# Patient Record
Sex: FEMALE | Race: WHITE | HISPANIC OR LATINO | Employment: FULL TIME | URBAN - METROPOLITAN AREA
[De-identification: names, ages, dates, MRNs, and addresses within clinical notes are randomized per-mention and may not be internally consistent; named-entity substitution may affect disease eponyms.]

---

## 2017-09-21 ENCOUNTER — APPOINTMENT (OUTPATIENT)
Dept: RADIOLOGY | Facility: CLINIC | Age: 52
End: 2017-09-21

## 2017-09-21 ENCOUNTER — ALLSCRIPTS OFFICE VISIT (OUTPATIENT)
Dept: OTHER | Facility: OTHER | Age: 52
End: 2017-09-21

## 2017-09-21 DIAGNOSIS — M25.561 PAIN IN RIGHT KNEE: ICD-10-CM

## 2017-09-21 DIAGNOSIS — M17.12 PRIMARY OSTEOARTHRITIS OF LEFT KNEE: ICD-10-CM

## 2017-09-21 DIAGNOSIS — M25.562 PAIN IN LEFT KNEE: ICD-10-CM

## 2017-09-21 PROCEDURE — 73564 X-RAY EXAM KNEE 4 OR MORE: CPT

## 2017-09-21 PROCEDURE — 73560 X-RAY EXAM OF KNEE 1 OR 2: CPT

## 2017-10-19 ENCOUNTER — HOSPITAL ENCOUNTER (EMERGENCY)
Facility: HOSPITAL | Age: 52
Discharge: HOME | End: 2017-10-19
Attending: EMERGENCY MEDICINE | Admitting: EMERGENCY MEDICINE
Payer: COMMERCIAL

## 2017-10-19 VITALS
HEART RATE: 61 BPM | TEMPERATURE: 97.8 F | DIASTOLIC BLOOD PRESSURE: 65 MMHG | WEIGHT: 160 LBS | SYSTOLIC BLOOD PRESSURE: 114 MMHG | OXYGEN SATURATION: 96 % | RESPIRATION RATE: 18 BRPM

## 2017-10-19 DIAGNOSIS — R51.9 HEADACHE: Primary | ICD-10-CM

## 2017-10-19 PROCEDURE — 96375 TX/PRO/DX INJ NEW DRUG ADDON: CPT

## 2017-10-19 PROCEDURE — 99283 EMERGENCY DEPT VISIT LOW MDM: CPT

## 2017-10-19 PROCEDURE — 96361 HYDRATE IV INFUSION ADD-ON: CPT

## 2017-10-19 PROCEDURE — 96374 THER/PROPH/DIAG INJ IV PUSH: CPT

## 2017-10-19 RX ORDER — DIPHENHYDRAMINE HYDROCHLORIDE 50 MG/ML
12.5 INJECTION INTRAMUSCULAR; INTRAVENOUS ONCE
Status: COMPLETED | OUTPATIENT
Start: 2017-10-19 | End: 2017-10-19

## 2017-10-19 RX ORDER — METOCLOPRAMIDE HYDROCHLORIDE 5 MG/ML
10 INJECTION INTRAMUSCULAR; INTRAVENOUS ONCE
Status: COMPLETED | OUTPATIENT
Start: 2017-10-19 | End: 2017-10-19

## 2017-10-19 RX ORDER — KETOROLAC TROMETHAMINE 30 MG/ML
15 INJECTION, SOLUTION INTRAMUSCULAR; INTRAVENOUS ONCE
Status: COMPLETED | OUTPATIENT
Start: 2017-10-19 | End: 2017-10-19

## 2017-10-19 RX ORDER — PHENAZOPYRIDINE HYDROCHLORIDE 200 MG/1
200 TABLET, FILM COATED ORAL
COMMUNITY
End: 2018-12-19

## 2017-10-19 RX ORDER — NITROFURANTOIN MACROCRYSTALS 100 MG/1
100 CAPSULE ORAL 4 TIMES DAILY
COMMUNITY
End: 2018-12-19

## 2017-10-19 RX ADMIN — SODIUM CHLORIDE 1000 ML: 0.9 INJECTION, SOLUTION INTRAVENOUS at 21:27

## 2017-10-19 RX ADMIN — METOCLOPRAMIDE 10 MG: 5 INJECTION, SOLUTION INTRAMUSCULAR; INTRAVENOUS at 21:27

## 2017-10-19 RX ADMIN — DIPHENHYDRAMINE HYDROCHLORIDE 12.5 MG: 50 INJECTION, SOLUTION INTRAMUSCULAR; INTRAVENOUS at 21:27

## 2017-10-19 RX ADMIN — KETOROLAC TROMETHAMINE 15 MG: 30 INJECTION, SOLUTION INTRAMUSCULAR at 21:27

## 2017-10-20 NOTE — ED NOTES
Pt  Reports taking Advil prior to arrival with no improvement        Nallely Ocampo, [place]  10/19/17 2033

## 2017-10-20 NOTE — DISCHARGE INSTRUCTIONS
Acute Headache, Ambulatory Care   GENERAL INFORMATION:   An acute headache  is pain or discomfort that starts suddenly and gets worse quickly  The cause of an acute headache may not be known  It may be triggered by stress, fatigue, hormones, food, or trauma  Common related symptoms include the following:   · Fever    · Sinus pressure    · Loss of memory    · Nausea or vomiting    · Problems with your vision, such as watery or red eyes, loss of vision, or pain in bright light    · Stiff neck    · Tenderness of the head and neck area    · Trouble staying awake, or being less alert than usual     · Weakness or less energy  Seek immediate care for the following symptoms:   · Severe pain    · A headache that occurs after a blow to the head, a fall, or other trauma     · Confusion or forgetfulness    · Numbness on one side of your face or body  Treatment for an acute headache  may include medicine to decrease pain  You may also need biofeedback or cognitive behavioral therapy  Ask your healthcare provider about these and other treatments for an acute headache  Manage my symptoms:   · Apply heat  on your head for 20 to 30 minutes every 2 hours for as many days as directed  Heat helps decrease pain and muscle spasms  You may alternate heat and ice  · Apply ice  on your head for 15 to 20 minutes every hour or as directed  Use an ice pack, or put crushed ice in a plastic bag  Cover it with a towel  Ice helps decrease pain  · Relax your muscles  Lie down in a comfortable position and close your eyes  Relax your muscles slowly  Start at your toes and work your way up your body  · Keep a record of your headaches  Write down when your headaches start and stop  Include your symptoms and what you were doing when the headache began  Record what you ate or drank for 24 hours before the headache started  Describe the pain and where it hurts  Keep track of what you did to treat your headache and whether it worked    Follow up with your healthcare provider as directed:  Bring your headache record with you when you see your healthcare provider  Write down your questions so you remember to ask them during your visits  CARE AGREEMENT:   You have the right to help plan your care  Learn about your health condition and how it may be treated  Discuss treatment options with your caregivers to decide what care you want to receive  You always have the right to refuse treatment  The above information is an  only  It is not intended as medical advice for individual conditions or treatments  Talk to your doctor, nurse or pharmacist before following any medical regimen to see if it is safe and effective for you  © 2014 [address] is for End User's use only and may not be sold, redistributed or otherwise used for commercial purposes  All illustrations and images included in CareNotes® are the copyrighted property of A D A M , Inc  or Elvis Beltran

## 2017-11-01 NOTE — ED PROVIDER NOTES
History  Chief Complaint   Patient presents with    Headache     Pt reports headache since Saturday, reports has hx of migranes has been taking ibuprofen and acetaminophen at home without any relief  Pt reports feeling pain to left eye from headache      70-year-old female presents complaining of a gradual onset generalized headache that began a few days ago  Patient has a history of migraines and states that this is similar to previous episodes of migraine  Denies visual changes, localized numbness, localized weakness  She has been tolerating solids and liquids without difficulty  She has had some nausea without vomiting  Complains of photophobia  No fevers  No history of trauma  History provided by:  Patient   used: No    Headache   Pain location:  Generalized  Quality:  Dull  Radiates to:  Does not radiate  Severity currently:  8/10  Severity at highest:  8/10  Onset quality:  Gradual  Timing:  Constant  Progression:  Unchanged  Similar to prior headaches: yes    Relieved by:  Nothing  Worsened by:  Light  Ineffective treatments:  Acetaminophen  Associated symptoms: nausea and photophobia    Associated symptoms: no abdominal pain, no back pain, no congestion, no cough, no diarrhea, no dizziness, no eye pain, no fatigue, no fever, no hearing loss, no neck pain, no neck stiffness, no numbness, no sore throat and no vomiting        Prior to Admission Medications   Prescriptions Last Dose Informant Patient Reported? Taking?   nitrofurantoin (MACRODANTIN) 100 mg capsule Past Week at Unknown time  Yes Yes   Sig: Take 100 mg by mouth 4 (four) times a day   phenazopyridine (PYRIDIUM) 200 mg tablet Past Week at Unknown time  Yes Yes   Sig: Take 200 mg by mouth 3 (three) times a day with meals      Facility-Administered Medications: None       History reviewed  No pertinent past medical history  History reviewed  No pertinent surgical history  History reviewed   No pertinent family history  I have reviewed and agree with the history as documented  Social History   Substance Use Topics    Smoking status: Never Smoker    Smokeless tobacco: Never Used    Alcohol use No        Review of Systems   Constitutional: Negative for activity change, appetite change, fatigue, fever and unexpected weight change  HENT: Negative for congestion, hearing loss, rhinorrhea, sore throat and voice change  Eyes: Positive for photophobia  Negative for pain and visual disturbance  Respiratory: Negative for apnea, cough, chest tightness and shortness of breath  Cardiovascular: Negative for chest pain  Gastrointestinal: Positive for nausea  Negative for abdominal pain, blood in stool, diarrhea and vomiting  Endocrine: Negative for polyphagia and polyuria  Genitourinary: Negative for difficulty urinating, dysuria, flank pain, frequency and urgency  Musculoskeletal: Negative for back pain, gait problem, neck pain and neck stiffness  Skin: Negative for color change and rash  Allergic/Immunologic: Negative for immunocompromised state  Neurological: Positive for headaches  Negative for dizziness, syncope, speech difficulty, light-headedness and numbness  Hematological: Does not bruise/bleed easily  Psychiatric/Behavioral: Negative for confusion and decreased concentration  Physical Exam  ED Triage Vitals [10/19/17 1956]   Temperature Pulse Respirations Blood Pressure SpO2   97 8 °F (36 6 °C) 87 16 147/70 97 %      Temp Source Heart Rate Source Patient Position - Orthostatic VS BP Location FiO2 (%)   Temporal Monitor Sitting Right arm --      Pain Score       8           Orthostatic Vital Signs  Vitals:    10/19/17 1956 10/19/17 2223 10/19/17 2320   BP: 147/70 106/51 114/65   Pulse: 87 70 61   Patient Position - Orthostatic VS: Sitting Lying Lying       Physical Exam   Constitutional: She is oriented to person, place, and time  She appears well-developed and well-nourished     HENT: Head: Normocephalic and atraumatic  Eyes: Conjunctivae and EOM are normal  Pupils are equal, round, and reactive to light  No scleral icterus  Normal visual fields   Neck: Normal range of motion  Neck supple  No JVD present  No tracheal deviation present  Cardiovascular: Normal rate and regular rhythm  Pulmonary/Chest: Effort normal and breath sounds normal  No respiratory distress  Abdominal: Soft  She exhibits no distension  There is no tenderness  Musculoskeletal: Normal range of motion  She exhibits no tenderness or deformity  Lymphadenopathy:     She has no cervical adenopathy  Neurological: She is alert and oriented to person, place, and time  GCS 15, No gross focal sensory or motor deficits  Cranial nerves 2-12 are grossly intact  Normal cerebellar testing  5/5 strength in all four extremities  Patient ambulates without difficulty or assistance  Skin: Skin is warm and dry  No rash noted  She is not diaphoretic  Psychiatric: She has a normal mood and affect  Vitals reviewed  ED Medications  Medications   sodium chloride 0 9 % bolus 1,000 mL (0 mL Intravenous Stopped 10/19/17 2237)   ketorolac (TORADOL) 30 mg/mL injection 15 mg (15 mg Intravenous Given 10/19/17 2127)   metoclopramide (REGLAN) injection 10 mg (10 mg Intravenous Given 10/19/17 2127)   diphenhydrAMINE (BENADRYL) injection 12 5 mg (12 5 mg Intravenous Given 10/19/17 2127)       Diagnostic Studies  Results Reviewed     None                 No orders to display              Procedures  Procedures       Phone Contacts  ED Phone Contact    ED Course  ED Course                                MDM  Number of Diagnoses or Management Options  Headache: new and requires workup  Diagnosis management comments: 51-year-old female presented complaining of headache, similar to previous episodes of migraine    Normal neurologic exam   No red flags on history or physical exam   She was treated symptomatically in the emergency department had good relief of her symptoms  She will be discharged to follow up with her primary care provider  We discussed return precautions for any new or worsening symptoms  Amount and/or Complexity of Data Reviewed  Review and summarize past medical records: yes      CritCare Time    Disposition  Final diagnoses:   Headache     Time reflects when diagnosis was documented in both MDM as applicable and the Disposition within this note     Time User Action Codes Description Comment    10/19/2017 11:13 PM Elsie Silva Add [R51] Headache       ED Disposition     ED Disposition Condition Comment    Discharge  Aakash Ward discharge to home/self care  Condition at discharge: Good        Follow-up Information     Follow up With Specialties Details Why [address] [facility] [address], [place] Family Medicine  Please follow-up with your primary care provider in the next 1 to 2 days to ensure resolution of your symptoms  If you have new or worsening symptoms please call your doctor or return to the emergency department  [address]          Discharge Medication List as of 10/19/2017 11:13 PM      CONTINUE these medications which have NOT CHANGED    Details   nitrofurantoin (MACRODANTIN) 100 mg capsule Take 100 mg by mouth 4 (four) times a day, Historical Med      phenazopyridine (PYRIDIUM) 200 mg tablet Take 200 mg by mouth 3 (three) times a day with meals, Historical Med           No discharge procedures on file      ED Provider  Electronically Signed by           Betty Parks MD  11/01/17 1600

## 2017-11-06 ENCOUNTER — GENERIC CONVERSION - ENCOUNTER (OUTPATIENT)
Dept: OTHER | Facility: OTHER | Age: 52
End: 2017-11-06

## 2017-11-06 DIAGNOSIS — M17.12 PRIMARY OSTEOARTHRITIS OF LEFT KNEE: ICD-10-CM

## 2017-11-16 ENCOUNTER — GENERIC CONVERSION - ENCOUNTER (OUTPATIENT)
Dept: OTHER | Facility: OTHER | Age: 52
End: 2017-11-16

## 2017-11-21 ENCOUNTER — GENERIC CONVERSION - ENCOUNTER (OUTPATIENT)
Dept: OTHER | Facility: OTHER | Age: 52
End: 2017-11-21

## 2017-12-18 ENCOUNTER — ALLSCRIPTS OFFICE VISIT (OUTPATIENT)
Dept: OTHER | Facility: OTHER | Age: 52
End: 2017-12-18

## 2017-12-19 NOTE — PROGRESS NOTES
Assessment  1  Left knee pain (719 46) (M25 562)   2  Primary localized osteoarthritis of left knee (715 16) (M17 12)    Plan  Primary localized osteoarthritis of left knee    · Follow-up PRN Evaluation and Treatment  Follow-up  Status: Complete  Done:[id number]    Discussion/Summary    Ms Mcguire is doing well  She will continue with physical therapy and transition home exercises when ready  She will follow up as needed  She will return to work without restrictions  She has not think she needs to work note at this time but will call if she does  Chief Complaint  presents for follow up L knee pain      History of Present Illness  HPI: 29-year-old female presents for follow-up left knee pain  At this time she denies pain  She has been went to physical therapy which has been helping  She is happy with her progress  Review of Systems   Constitutional: not feeling poorly  Musculoskeletal: as noted in HPI  Active Problems  1  Left knee pain (719 46) (M25 562)   2  Primary localized osteoarthritis of left knee (715 16) (M17 12)   3  Right knee pain (719 46) (M25 561)    Past Medical History   · History of hypertension (V12 59) (Z86 79)    The active problems and past medical history were reviewed and updated today  Surgical History   · History of Neck Surgery   · History of Surgery Seminal Vesicles    The surgical history was reviewed and updated today  Family History  Mother    · Denied: Family history of arthritis   · Denied: Family history of malignant neoplasm  Family History    · Denied: Family history of arthritis   · Denied: Family history of malignant neoplasm    The family history was reviewed and updated today  Social History   · Never a smoker  The social history was reviewed and updated today  Current Meds   1  Diclofenac Sodium 75 MG Oral Tablet Delayed Release; TAKE 1 TABLET BY MOUTH TWICE A DAY AS NEEDED FOR PAIN with food;  Therapy: [id number] to (Evaluate:20Nov2017); Last Rx:[id number] Ordered    The medication list was reviewed and updated today  Allergies  1  Penicillins   2  Sulfa Drugs    Vitals  Signs   Heart Rate: 80  Systolic: 860  Diastolic: 75  Height: 5 ft 3 in  Weight: 162 lb 5 oz  BMI Calculated: 28 75  BSA Calculated: 1 77    Physical Exam  hearing intact   Left Knee: Appearance: Normal  ROM: Full  Motor: Normal  Special Test: no laxity on Valgus Stress-- and-- no laxity on Varus Stress  Constitutional - General appearance: Normal   Musculoskeletal - Lower extremity compartments: Normal   Neurologic - Lower extremity peripheral neuro exam: Normal  Neuromuscular strength examination findings: involved side normal foot eversion, inversion, and great toe extension    Psychiatric - Orientation to person, place, and time: Normal -- Mood and affect: Normal       Signatures   Electronically signed by : Christin Gomez DO; Dec 18 2017 12:35PM EST                       (Author)

## 2018-01-13 VITALS
WEIGHT: 162.31 LBS | DIASTOLIC BLOOD PRESSURE: 72 MMHG | HEART RATE: 82 BPM | BODY MASS INDEX: 28.76 KG/M2 | SYSTOLIC BLOOD PRESSURE: 128 MMHG | HEIGHT: 63 IN

## 2018-01-22 VITALS
HEIGHT: 63 IN | BODY MASS INDEX: 28.76 KG/M2 | HEART RATE: 80 BPM | WEIGHT: 162.31 LBS | SYSTOLIC BLOOD PRESSURE: 125 MMHG | DIASTOLIC BLOOD PRESSURE: 75 MMHG

## 2018-01-22 VITALS
HEART RATE: 94 BPM | SYSTOLIC BLOOD PRESSURE: 133 MMHG | BODY MASS INDEX: 28.35 KG/M2 | DIASTOLIC BLOOD PRESSURE: 83 MMHG | WEIGHT: 160 LBS | HEIGHT: 63 IN

## 2018-12-19 ENCOUNTER — HOSPITAL ENCOUNTER (EMERGENCY)
Facility: HOSPITAL | Age: 53
Discharge: HOME | End: 2018-12-19
Attending: EMERGENCY MEDICINE
Payer: COMMERCIAL

## 2018-12-19 VITALS
TEMPERATURE: 98.4 F | SYSTOLIC BLOOD PRESSURE: 110 MMHG | OXYGEN SATURATION: 99 % | RESPIRATION RATE: 18 BRPM | DIASTOLIC BLOOD PRESSURE: 56 MMHG | HEART RATE: 59 BPM

## 2018-12-19 DIAGNOSIS — R11.0 NAUSEA: ICD-10-CM

## 2018-12-19 DIAGNOSIS — R10.12 LEFT UPPER QUADRANT PAIN: Primary | ICD-10-CM

## 2018-12-19 LAB
ALBUMIN SERPL BCP-MCNC: 4.2 G/DL (ref 3.5–5)
ALP SERPL-CCNC: 89 U/L (ref 46–116)
ALT SERPL W P-5'-P-CCNC: 27 U/L (ref 12–78)
ANION GAP SERPL CALCULATED.3IONS-SCNC: 7 MMOL/L (ref 4–13)
AST SERPL W P-5'-P-CCNC: 20 U/L (ref 5–45)
BACTERIA UR QL AUTO: ABNORMAL /HPF
BASOPHILS # BLD AUTO: 0.02 THOUSANDS/ΜL (ref 0–0.1)
BASOPHILS NFR BLD AUTO: 0 % (ref 0–1)
BILIRUB SERPL-MCNC: 0.56 MG/DL (ref 0.2–1)
BILIRUB UR QL STRIP: NEGATIVE
BUN SERPL-MCNC: 13 MG/DL (ref 5–25)
CALCIUM SERPL-MCNC: 9.7 MG/DL (ref 8.3–10.1)
CHLORIDE SERPL-SCNC: 102 MMOL/L (ref 100–108)
CLARITY UR: ABNORMAL
CO2 SERPL-SCNC: 30 MMOL/L (ref 21–32)
COLOR UR: YELLOW
CREAT SERPL-MCNC: 0.83 MG/DL (ref 0.6–1.3)
EOSINOPHIL # BLD AUTO: 0.26 THOUSAND/ΜL (ref 0–0.61)
EOSINOPHIL NFR BLD AUTO: 5 % (ref 0–6)
ERYTHROCYTE [DISTWIDTH] IN BLOOD BY AUTOMATED COUNT: 12.2 % (ref 11.6–15.1)
GFR SERPL CREATININE-BSD FRML MDRD: 81 ML/MIN/1.73SQ M
GLUCOSE SERPL-MCNC: 120 MG/DL (ref 65–140)
GLUCOSE UR STRIP-MCNC: NEGATIVE MG/DL
HCT VFR BLD AUTO: 43.9 % (ref 34.8–46.1)
HGB BLD-MCNC: 14 G/DL (ref 11.5–15.4)
HGB UR QL STRIP.AUTO: ABNORMAL
IMM GRANULOCYTES # BLD AUTO: 0.01 THOUSAND/UL (ref 0–0.2)
IMM GRANULOCYTES NFR BLD AUTO: 0 % (ref 0–2)
KETONES UR STRIP-MCNC: NEGATIVE MG/DL
LEUKOCYTE ESTERASE UR QL STRIP: NEGATIVE
LIPASE SERPL-CCNC: 143 U/L (ref 73–393)
LYMPHOCYTES # BLD AUTO: 2.65 THOUSANDS/ΜL (ref 0.6–4.47)
LYMPHOCYTES NFR BLD AUTO: 52 % (ref 14–44)
MCH RBC QN AUTO: 28.6 PG (ref 26.8–34.3)
MCHC RBC AUTO-ENTMCNC: 31.9 G/DL (ref 31.4–37.4)
MCV RBC AUTO: 90 FL (ref 82–98)
MONOCYTES # BLD AUTO: 0.45 THOUSAND/ΜL (ref 0.17–1.22)
MONOCYTES NFR BLD AUTO: 9 % (ref 4–12)
NEUTROPHILS # BLD AUTO: 1.77 THOUSANDS/ΜL (ref 1.85–7.62)
NEUTS SEG NFR BLD AUTO: 34 % (ref 43–75)
NITRITE UR QL STRIP: NEGATIVE
NON-SQ EPI CELLS URNS QL MICRO: ABNORMAL /HPF
NRBC BLD AUTO-RTO: 0 /100 WBCS
PH UR STRIP.AUTO: 6 [PH] (ref 4.5–8)
PLATELET # BLD AUTO: 343 THOUSANDS/UL (ref 149–390)
PMV BLD AUTO: 9.1 FL (ref 8.9–12.7)
POTASSIUM SERPL-SCNC: 4 MMOL/L (ref 3.5–5.3)
PROT SERPL-MCNC: 8 G/DL (ref 6.4–8.2)
PROT UR STRIP-MCNC: NEGATIVE MG/DL
RBC # BLD AUTO: 4.89 MILLION/UL (ref 3.81–5.12)
RBC #/AREA URNS AUTO: ABNORMAL /HPF
SODIUM SERPL-SCNC: 139 MMOL/L (ref 136–145)
SP GR UR STRIP.AUTO: 1.01 (ref 1–1.03)
UROBILINOGEN UR QL STRIP.AUTO: 0.2 E.U./DL
WBC # BLD AUTO: 5.16 THOUSAND/UL (ref 4.31–10.16)
WBC #/AREA URNS AUTO: ABNORMAL /HPF

## 2018-12-19 PROCEDURE — 85025 COMPLETE CBC W/AUTO DIFF WBC: CPT | Performed by: EMERGENCY MEDICINE

## 2018-12-19 PROCEDURE — 96375 TX/PRO/DX INJ NEW DRUG ADDON: CPT

## 2018-12-19 PROCEDURE — 36415 COLL VENOUS BLD VENIPUNCTURE: CPT | Performed by: EMERGENCY MEDICINE

## 2018-12-19 PROCEDURE — 81001 URINALYSIS AUTO W/SCOPE: CPT

## 2018-12-19 PROCEDURE — 80053 COMPREHEN METABOLIC PANEL: CPT | Performed by: EMERGENCY MEDICINE

## 2018-12-19 PROCEDURE — 99284 EMERGENCY DEPT VISIT MOD MDM: CPT

## 2018-12-19 PROCEDURE — 96374 THER/PROPH/DIAG INJ IV PUSH: CPT

## 2018-12-19 PROCEDURE — 83690 ASSAY OF LIPASE: CPT | Performed by: EMERGENCY MEDICINE

## 2018-12-19 PROCEDURE — 96361 HYDRATE IV INFUSION ADD-ON: CPT

## 2018-12-19 RX ORDER — KETOROLAC TROMETHAMINE 30 MG/ML
15 INJECTION, SOLUTION INTRAMUSCULAR; INTRAVENOUS ONCE
Status: COMPLETED | OUTPATIENT
Start: 2018-12-19 | End: 2018-12-19

## 2018-12-19 RX ORDER — DICYCLOMINE HCL 20 MG
20 TABLET ORAL 2 TIMES DAILY
Qty: 10 TABLET | Refills: 0 | Status: SHIPPED | OUTPATIENT
Start: 2018-12-19

## 2018-12-19 RX ORDER — METOCLOPRAMIDE 10 MG/1
10 TABLET ORAL EVERY 6 HOURS
Qty: 10 TABLET | Refills: 0 | Status: SHIPPED | OUTPATIENT
Start: 2018-12-19

## 2018-12-19 RX ORDER — METOCLOPRAMIDE HYDROCHLORIDE 5 MG/ML
10 INJECTION INTRAMUSCULAR; INTRAVENOUS ONCE
Status: COMPLETED | OUTPATIENT
Start: 2018-12-19 | End: 2018-12-19

## 2018-12-19 RX ADMIN — SODIUM CHLORIDE 1000 ML: 0.9 INJECTION, SOLUTION INTRAVENOUS at 12:21

## 2018-12-19 RX ADMIN — METOCLOPRAMIDE 10 MG: 5 INJECTION, SOLUTION INTRAMUSCULAR; INTRAVENOUS at 12:22

## 2018-12-19 RX ADMIN — KETOROLAC TROMETHAMINE 15 MG: 30 INJECTION, SOLUTION INTRAMUSCULAR at 12:21

## 2018-12-19 NOTE — DISCHARGE INSTRUCTIONS
Dolor abdominal   LO QUE NECESITA SABER:   El dolor abdominal puede ser sordo, [place], o velma  Usted puede sentir dolor localizado en neris beth área del abdomen o en todo el abdomen  El dolor puede ser causado por neris afección jaimie estreñimiento, sensibilidad o intoxicación alimentaria, infección o neris obstrucción  Asimismo, el dolor abdominal puede deberse a neris hernia, apendicitis o [place]  Las enfermedades del hígado, la vesícula o el riñón también pueden causar dolor abdominal  La causa del dolor abdominal puede ser desconocida  INSTRUCCIONES SOBRE EL ALEM HOSPITALARIA:   Regrese a la brad de emergencias si:   · Usted comienza a sentir un dolor en el pecho o dificultad para respirar que antes no sentía  · Usted siente un dolor con pulsaciones en la parte superior del abdomen o en la parte inferior de la espalda que de repente se vuelve claudia  · El dolor se localiza en la parte inferior derecha del abdomen y [place] cuando se [place]  · Usted tiene fiebre por encima de los 100 4 °F (38 °C) o escalofríos  · Usted tiene vómitos y no puede retener líquidos ni alimentos en el estómago  · El dolor no mejora o empeora en las próximas 8 a 12 horas  · Usted nota felix en de la paz vómito o heces, o éstas tienen un aspecto negruzco y alquitranado  · [place] piel o las partes zev de chinmay ojos se vuelven amarillentas  · Si usted es neris geovanni y presenta abundante sangrado vaginal que no es de la paz menstruación  Pregúntele a de la paz [place] vitaminas y minerales son adecuados para usted  · Usted siente dolor en la parte inferior de la espalda  · Usted es [place] y tiene dolor en los testículos  · Siente dolor al Liseth Javier  · Usted tiene preguntas o inquietudes acerca de de la paz condición o cuidado  Acuda en 24 horas a neris doe de seguimiento con de la paz médico o jaimie se le indique:  Anote chinmay preguntas para que se acuerde de hacerlas brett chinmay visitas     Medicamentos:   · Ryanne Valentine ser administrados para calmar de la paz estómago y prevenir los vómitos o para disminuir el dolor  Pregunte cómo se debe lo los analgésicos de forma alejandre  · [place] chinmay medicamentos jaimie se le haya indicado  Consulte con de la paz médico si usted tej que de la paz medicamento no le está ayudando o si presenta efectos secundarios  Infórmele si es alérgico a algún medicamento  Mantenga neris lista actualizada de los [place], las vitaminas y los productos herbales que mable  Incluya los siguientes datos de los medicamentos: cantidad, frecuencia y motivo de administración  Traiga con usted la lista o los envases de la píldoras a chinmay citas de seguimiento  Lleve la lista de los medicamentos con usted en piyush de neris emergencia  © 2017 [address] Information is for End User's use only and may not be sold, redistributed or otherwise used for commercial purposes  All illustrations and images included in CareNotes® are the copyrighted property of A D A M , Inc  or Elvis Beltran  Esta información es sólo para uso en educación  [place] intención no es darle un consejo médico sobre enfermedades o tratamientos  Colsulte con de la paz [place] farmacéutico antes de seguir cualquier régimen médico para saber si es seguro y efectivo para usted

## 2018-12-19 NOTE — ED PROVIDER NOTES
History  Chief Complaint   Patient presents with    Abdominal Pain     Left sided abdominal pain that started today  Has nausea  Denies v/d  History provided by:  Patient   used: No    Abdominal Pain   Pain location:  LUQ  Pain quality: throbbing    Pain radiates to:  Does not radiate  Duration:  4 hours  Timing:  Constant  Progression:  Unchanged  Chronicity:  New  Context: not previous surgeries    Relieved by:  Nothing  Worsened by:  Nothing  Ineffective treatments:  NSAIDs  Associated symptoms: nausea    Associated symptoms: no chills, no constipation, no cough, no diarrhea, no dysuria, no fever, no hematuria, no vaginal bleeding, no vaginal discharge and no vomiting        None       History reviewed  No pertinent past medical history  History reviewed  No pertinent surgical history  History reviewed  No pertinent family history  I have reviewed and agree with the history as documented  Social History   Substance Use Topics    Smoking status: Never Smoker    Smokeless tobacco: Never Used    Alcohol use No        Review of Systems   Constitutional: Negative for appetite change, chills and fever  HENT: Positive for rhinorrhea  Negative for congestion  Respiratory: Negative for cough  Gastrointestinal: Positive for abdominal pain and nausea  Negative for abdominal distention, blood in stool, constipation, diarrhea and vomiting  Genitourinary: Negative for dysuria, flank pain, frequency, hematuria, urgency, vaginal bleeding and vaginal discharge  Musculoskeletal: Negative for back pain  All other systems reviewed and are negative  Physical Exam  Physical Exam   Constitutional: She is oriented to person, place, and time  She appears well-developed and well-nourished  No distress  HENT:   Head: Normocephalic  Mouth/Throat: Oropharynx is clear and moist and mucous membranes are normal    Neck: Normal range of motion  Neck supple     Cardiovascular: Normal rate, regular rhythm, normal heart sounds and intact distal pulses  Exam reveals no gallop and no friction rub  No murmur heard  Pulmonary/Chest: Effort normal and breath sounds normal  No respiratory distress  She has no wheezes  She has no rales  Abdominal: Soft  Normal appearance and bowel sounds are normal  She exhibits no distension and no mass  There is tenderness in the left upper quadrant  There is no rigidity, no rebound, no guarding, no CVA tenderness, no tenderness at McBurney's point and negative Araujo's sign  Lymphadenopathy:     She has no cervical adenopathy  Neurological: She is alert and oriented to person, place, and time  Skin: Skin is warm, dry and intact  No rash noted  No pallor  Nursing note and vitals reviewed        Vital Signs  ED Triage Vitals [12/19/18 1202]   Temperature Pulse Respirations Blood Pressure SpO2   98 4 °F (36 9 °C) 65 16 117/56 98 %      Temp Source Heart Rate Source Patient Position - Orthostatic VS BP Location FiO2 (%)   Temporal Monitor Sitting Right arm --      Pain Score       8           Vitals:    12/19/18 1202 12/19/18 1329   BP: 117/56 110/56   Pulse: 65 59   Patient Position - Orthostatic VS: Sitting Lying       Visual Acuity      ED Medications  Medications   sodium chloride 0 9 % bolus 1,000 mL (0 mL Intravenous Stopped 12/19/18 1329)   ketorolac (TORADOL) injection 15 mg (15 mg Intravenous Given 12/19/18 1221)   metoclopramide (REGLAN) injection 10 mg (10 mg Intravenous Given 12/19/18 1222)       Diagnostic Studies  Results Reviewed     Procedure Component Value Units Date/Time    Urine Microscopic [[id number]]  (Abnormal) Collected:  12/19/18 1315    Lab Status:  Final result Specimen:  Urine from Urine, Clean Catch Updated:  12/19/18 1323     RBC, UA 2-4 (A) /hpf      WBC, UA None Seen /hpf      Epithelial Cells Occasional /hpf      Bacteria, UA None Seen /hpf     POCT urinalysis dipstick [[id number]]  (Abnormal) Resulted:  12/19/18 1301    Lab Status:  Final result Specimen:  Urine from Urine, Other Updated:  12/19/18 1301    ED Urine Macroscopic [[id number]]  (Abnormal) Collected:  12/19/18 1315    Lab Status:  Final result Specimen:  Urine Updated:  12/19/18 1259     Color, UA Yellow     Clarity, UA Slightly Cloudy     pH, UA 6 0     Leukocytes, UA Negative     Nitrite, UA Negative     Protein, UA Negative mg/dl      Glucose, UA Negative mg/dl      Ketones, UA Negative mg/dl      Urobilinogen, UA 0 2 E U /dl      Bilirubin, UA Negative     Blood, UA Trace (A)     Specific [place], UA 1 015    Narrative:       CLINITEK RESULT    Comprehensive metabolic panel [[id number]] Collected:  12/19/18 1220    Lab Status:  Final result Specimen:  Blood from Arm, Left Updated:  12/19/18 1248     Sodium 139 mmol/L      Potassium 4 0 mmol/L      Chloride 102 mmol/L      CO2 30 mmol/L      ANION GAP 7 mmol/L      BUN 13 mg/dL      Creatinine 0 83 mg/dL      Glucose 120 mg/dL      Calcium 9 7 mg/dL      AST 20 U/L      ALT 27 U/L      Alkaline Phosphatase 89 U/L      Total Protein 8 0 g/dL      Albumin 4 2 g/dL      Total Bilirubin 0 56 mg/dL      eGFR 81 ml/min/1 73sq m     Narrative:         National Kidney Disease Education Program recommendations are as follows:  GFR calculation is accurate only with a steady state creatinine  Chronic Kidney disease less than 60 ml/min/1 73 sq  meters  Kidney failure less than 15 ml/min/1 73 sq  meters      Lipase [[id number]]  (Normal) Collected:  12/19/18 1220    Lab Status:  Final result Specimen:  Blood from Arm, Left Updated:  12/19/18 1248     Lipase 143 u/L     CBC and differential [[id number]]  (Abnormal) Collected:  12/19/18 1220    Lab Status:  Final result Specimen:  Blood from Arm, Left Updated:  12/19/18 1230     WBC 5 16 Thousand/uL      RBC 4 89 Million/uL      Hemoglobin 14 0 g/dL      Hematocrit 43 9 %      MCV 90 fL      MCH 28 6 pg      MCHC 31 9 g/dL      RDW 12 2 %      MPV 9 1 fL      Platelets 117 Thousands/uL nRBC 0 /100 WBCs      Neutrophils Relative 34 (L) %      Immat GRANS % 0 %      Lymphocytes Relative 52 (H) %      Monocytes Relative 9 %      Eosinophils Relative 5 %      Basophils Relative 0 %      Neutrophils Absolute 1 77 (L) Thousands/µL      Immature Grans Absolute 0 01 Thousand/uL      Lymphocytes Absolute 2 65 Thousands/µL      Monocytes Absolute 0 45 Thousand/µL      Eosinophils Absolute 0 26 Thousand/µL      Basophils Absolute 0 02 Thousands/µL                  No orders to display              Procedures  Procedures       Phone Contacts  ED Phone Contact    ED Course  ED Course as of Dec 19 1359   Wed Dec 19, 2018   1324 Pt feels better  Updated pt and family on labs  MDM  Number of Diagnoses or Management Options     Amount and/or Complexity of Data Reviewed  Clinical lab tests: ordered and reviewed  Tests in the medicine section of CPT®: reviewed and ordered      CritCare Time    Disposition  Final diagnoses:   Left upper quadrant pain   Nausea     Time reflects when diagnosis was documented in both MDM as applicable and the Disposition within this note     Time User Action Codes Description Comment    12/19/2018  1:20 PM Gabbi Ham [R10 12] Left upper quadrant pain     12/19/2018  1:21 PM Niurka Cartagena [R11 0] Nausea       ED Disposition     ED Disposition Condition Comment    Discharge  [place] discharge to home/self care  Condition at discharge: Good        Follow-up Information    None         Discharge Medication List as of 12/19/2018  1:21 PM      START taking these medications    Details   dicyclomine (BENTYL) 20 mg tablet Take 1 tablet (20 mg total) by mouth 2 (two) times a day, Starting Wed 12/19/2018, Print      metoclopramide (REGLAN) 10 mg tablet Take 1 tablet (10 mg total) by mouth every 6 (six) hours, Starting Wed 12/19/2018, Print           No discharge procedures on file      ED Provider  Electronically Signed by Zeny Coto 24, DO  12/19/18 1351

## 2019-09-15 ENCOUNTER — HOSPITAL ENCOUNTER (EMERGENCY)
Facility: HOSPITAL | Age: 54
Discharge: HOME | End: 2019-09-15
Attending: EMERGENCY MEDICINE | Admitting: EMERGENCY MEDICINE
Payer: COMMERCIAL

## 2019-09-15 ENCOUNTER — APPOINTMENT (EMERGENCY)
Dept: CT IMAGING | Facility: HOSPITAL | Age: 54
End: 2019-09-15
Payer: COMMERCIAL

## 2019-09-15 VITALS
HEART RATE: 72 BPM | DIASTOLIC BLOOD PRESSURE: 50 MMHG | RESPIRATION RATE: 16 BRPM | BODY MASS INDEX: 24.45 KG/M2 | SYSTOLIC BLOOD PRESSURE: 100 MMHG | WEIGHT: 138.01 LBS | OXYGEN SATURATION: 94 % | TEMPERATURE: 97.6 F

## 2019-09-15 DIAGNOSIS — R10.31 RIGHT LOWER QUADRANT ABDOMINAL PAIN: Primary | ICD-10-CM

## 2019-09-15 LAB
ANION GAP SERPL CALCULATED.3IONS-SCNC: 7 MMOL/L (ref 4–13)
BACTERIA UR QL AUTO: ABNORMAL /HPF
BASOPHILS # BLD AUTO: 0.03 THOUSANDS/ΜL (ref 0–0.1)
BASOPHILS NFR BLD AUTO: 1 % (ref 0–1)
BILIRUB UR QL STRIP: NEGATIVE
BUN SERPL-MCNC: 12 MG/DL (ref 5–25)
CALCIUM SERPL-MCNC: 9.2 MG/DL (ref 8.3–10.1)
CHLORIDE SERPL-SCNC: 104 MMOL/L (ref 100–108)
CLARITY UR: CLEAR
CO2 SERPL-SCNC: 31 MMOL/L (ref 21–32)
COLOR UR: YELLOW
CREAT SERPL-MCNC: 0.67 MG/DL (ref 0.6–1.3)
EOSINOPHIL # BLD AUTO: 0.22 THOUSAND/ΜL (ref 0–0.61)
EOSINOPHIL NFR BLD AUTO: 4 % (ref 0–6)
ERYTHROCYTE [DISTWIDTH] IN BLOOD BY AUTOMATED COUNT: 11.9 % (ref 11.6–15.1)
GFR SERPL CREATININE-BSD FRML MDRD: 100 ML/MIN/1.73SQ M
GLUCOSE SERPL-MCNC: 111 MG/DL (ref 65–140)
GLUCOSE UR STRIP-MCNC: NEGATIVE MG/DL
HCT VFR BLD AUTO: 43.7 % (ref 34.8–46.1)
HGB BLD-MCNC: 14 G/DL (ref 11.5–15.4)
HGB UR QL STRIP.AUTO: ABNORMAL
IMM GRANULOCYTES # BLD AUTO: 0.01 THOUSAND/UL (ref 0–0.2)
IMM GRANULOCYTES NFR BLD AUTO: 0 % (ref 0–2)
KETONES UR STRIP-MCNC: NEGATIVE MG/DL
LEUKOCYTE ESTERASE UR QL STRIP: NEGATIVE
LYMPHOCYTES # BLD AUTO: 2.81 THOUSANDS/ΜL (ref 0.6–4.47)
LYMPHOCYTES NFR BLD AUTO: 48 % (ref 14–44)
MCH RBC QN AUTO: 28.7 PG (ref 26.8–34.3)
MCHC RBC AUTO-ENTMCNC: 32 G/DL (ref 31.4–37.4)
MCV RBC AUTO: 90 FL (ref 82–98)
MONOCYTES # BLD AUTO: 0.54 THOUSAND/ΜL (ref 0.17–1.22)
MONOCYTES NFR BLD AUTO: 10 % (ref 4–12)
NEUTROPHILS # BLD AUTO: 2.09 THOUSANDS/ΜL (ref 1.85–7.62)
NEUTS SEG NFR BLD AUTO: 37 % (ref 43–75)
NITRITE UR QL STRIP: NEGATIVE
NON-SQ EPI CELLS URNS QL MICRO: ABNORMAL /HPF
NRBC BLD AUTO-RTO: 0 /100 WBCS
PH UR STRIP.AUTO: 6 [PH] (ref 4.5–8)
PLATELET # BLD AUTO: 362 THOUSANDS/UL (ref 149–390)
PMV BLD AUTO: 9.1 FL (ref 8.9–12.7)
POTASSIUM SERPL-SCNC: 3.8 MMOL/L (ref 3.5–5.3)
PROT UR STRIP-MCNC: NEGATIVE MG/DL
RBC # BLD AUTO: 4.87 MILLION/UL (ref 3.81–5.12)
RBC #/AREA URNS AUTO: ABNORMAL /HPF
SODIUM SERPL-SCNC: 142 MMOL/L (ref 136–145)
SP GR UR STRIP.AUTO: 1.02 (ref 1–1.03)
UROBILINOGEN UR QL STRIP.AUTO: 0.2 E.U./DL
WBC # BLD AUTO: 5.7 THOUSAND/UL (ref 4.31–10.16)
WBC #/AREA URNS AUTO: ABNORMAL /HPF

## 2019-09-15 PROCEDURE — 80048 BASIC METABOLIC PNL TOTAL CA: CPT | Performed by: EMERGENCY MEDICINE

## 2019-09-15 PROCEDURE — 74177 CT ABD & PELVIS W/CONTRAST: CPT

## 2019-09-15 PROCEDURE — 81001 URINALYSIS AUTO W/SCOPE: CPT

## 2019-09-15 PROCEDURE — 96374 THER/PROPH/DIAG INJ IV PUSH: CPT

## 2019-09-15 PROCEDURE — 99284 EMERGENCY DEPT VISIT MOD MDM: CPT

## 2019-09-15 PROCEDURE — 99284 EMERGENCY DEPT VISIT MOD MDM: CPT | Performed by: EMERGENCY MEDICINE

## 2019-09-15 PROCEDURE — 36415 COLL VENOUS BLD VENIPUNCTURE: CPT | Performed by: EMERGENCY MEDICINE

## 2019-09-15 PROCEDURE — 85025 COMPLETE CBC W/AUTO DIFF WBC: CPT | Performed by: EMERGENCY MEDICINE

## 2019-09-15 RX ORDER — NAPROXEN 500 MG/1
500 TABLET ORAL 2 TIMES DAILY PRN
Qty: 15 TABLET | Refills: 0 | Status: SHIPPED | OUTPATIENT
Start: 2019-09-15

## 2019-09-15 RX ORDER — KETOROLAC TROMETHAMINE 30 MG/ML
15 INJECTION, SOLUTION INTRAMUSCULAR; INTRAVENOUS ONCE
Status: COMPLETED | OUTPATIENT
Start: 2019-09-15 | End: 2019-09-15

## 2019-09-15 RX ADMIN — IOHEXOL 100 ML: 350 INJECTION, SOLUTION INTRAVENOUS at 17:41

## 2019-09-15 RX ADMIN — KETOROLAC TROMETHAMINE 15 MG: 30 INJECTION, SOLUTION INTRAMUSCULAR at 16:50

## 2019-09-15 NOTE — ED PROVIDER NOTES
History  Chief Complaint   Patient presents with    Abdominal Pain     lower abdomen pain x2 weeks, denies fever , denies N/V/D     A 59-year-old female with no significant past medical history; presents with right lower quadrant pain that has been present for the past two weeks  Pain radiates toward the right flank  Pain has been constant in nature, described as sharp and stabbing  Pain has gotten progressively worse since onset  Patient denies associated fever, chills, chest pain, shortness of breath, nausea, vomiting, diarrhea, constipation, dysuria, hematuria, urinary frequency/urgency, vaginal bleeding, peripheral edema and rashes  Patient took a dose of Tylenol without relief of her symptoms, however no medication today  Patient does report in July being seen at [address] [place] [address] ED where she was diagnosed with a right kidney stone  Patient did not follow up with Urology at that time  Assessment & plan:  Right lower quadrant abdominal pain, with reproducible tenderness  Also pain in the right flank  Suspect symptoms are secondary to recurrent kidney stones, however due to persistence of pain and location of tenderness will obtain CT with contrast for further evaluation  Will also check lab work and UA for infection  Will give Toradol  History provided by:  Patient, medical records and friend     Prior to Admission Medications   Prescriptions Last Dose Informant Patient Reported? Taking?   dicyclomine (BENTYL) 20 mg tablet   No No   Sig: Take 1 tablet (20 mg total) by mouth 2 (two) times a day   metoclopramide (REGLAN) 10 mg tablet   No No   Sig: Take 1 tablet (10 mg total) by mouth every 6 (six) hours      Facility-Administered Medications: None       History reviewed  No pertinent past medical history  History reviewed  No pertinent surgical history  History reviewed  No pertinent family history  I have reviewed and agree with the history as documented      Social History     Tobacco Use    Smoking status: Never Smoker    Smokeless tobacco: Never Used   Substance Use Topics    Alcohol use: No    Drug use: No        Review of Systems   Gastrointestinal: Positive for abdominal pain (RLQ)  Genitourinary: Positive for flank pain ( right)  All other systems reviewed and are negative  Physical Exam  Physical Exam  General Appearance: alert and oriented, nad, non toxic appearing  Skin:  Warm, dry, intact  HEENT: atraumatic, normocephalic  Neck: Supple, trachea midline  Cardiac: RRR; no murmurs, rub, gallops  Pulmonary: lungs CTAB; no wheezes, rales, rhonchi  Gastrointestinal: abdomen soft, RLQ and suprapubic tenderness, nondistended; no guarding or rebound tenderness; good bowel sounds, no mass or bruits  Right CVA tenderness, no left CVA tenderness    Extremities:  no pedal edema, 2+ pulses; no calf tenderness, no clubbing, no cyanosis  Neuro:  no focal motor or sensory deficits, CN 2-12 grossly intact  Psych:  Normal mood and affect, normal judgement and insight      Vital Signs  ED Triage Vitals [09/15/19 1621]   Temperature Pulse Respirations Blood Pressure SpO2   97 6 °F (36 4 °C) 73 16 112/55 97 %      Temp Source Heart Rate Source Patient Position - Orthostatic VS BP Location FiO2 (%)   Temporal Monitor Sitting Right arm --      Pain Score       8           Vitals:    09/15/19 1621   BP: 112/55   Pulse: 73   Patient Position - Orthostatic VS: Sitting         Visual Acuity      ED Medications  Medications   ketorolac (TORADOL) injection 15 mg (15 mg Intravenous Given 9/15/19 1650)   iohexol (OMNIPAQUE) 350 MG/ML injection (MULTI-DOSE) 100 mL (100 mL Intravenous Given 9/15/19 1741)       Diagnostic Studies  Results Reviewed     Procedure Component Value Units Date/Time    Basic metabolic panel [[id number]] Collected:  09/15/19 1649    Lab Status:  Final result Specimen:  Blood from Arm, Right Updated:  09/15/19 1714     Sodium 142 mmol/L      Potassium 3 8 mmol/L      Chloride 104 mmol/L      CO2 31 mmol/L      ANION GAP 7 mmol/L      BUN 12 mg/dL      Creatinine 0 67 mg/dL      Glucose 111 mg/dL      Calcium 9 2 mg/dL      eGFR 100 ml/min/1 73sq m     Narrative:       National Kidney Disease Foundation guidelines for Chronic Kidney Disease (CKD):     Stage 1 with normal or high GFR (GFR > 90 mL/min/1 73 square meters)    Stage 2 Mild CKD (GFR = 60-89 mL/min/1 73 square meters)    Stage 3A Moderate CKD (GFR = 45-59 mL/min/1 73 square meters)    Stage 3B Moderate CKD (GFR = 30-44 mL/min/1 73 square meters)    Stage 4 Severe CKD (GFR = 15-29 mL/min/1 73 square meters)    Stage 5 End Stage CKD (GFR <15 mL/min/1 73 square meters)  Note: GFR calculation is accurate only with a steady state creatinine    Urine Microscopic [[id number]]  (Abnormal) Collected:  09/15/19 1631    Lab Status:  Final result Specimen:  Urine, Clean Catch Updated:  09/15/19 1703     RBC, UA 0-1 /hpf      WBC, UA 1-2 /hpf      Epithelial Cells Occasional /hpf      Bacteria, UA None Seen /hpf     CBC and differential [[id number]]  (Abnormal) Collected:  09/15/19 1649    Lab Status:  Final result Specimen:  Blood from Arm, Right Updated:  09/15/19 1658     WBC 5 70 Thousand/uL      RBC 4 87 Million/uL      Hemoglobin 14 0 g/dL      Hematocrit 43 7 %      MCV 90 fL      MCH 28 7 pg      MCHC 32 0 g/dL      RDW 11 9 %      MPV 9 1 fL      Platelets 733 Thousands/uL      nRBC 0 /100 WBCs      Neutrophils Relative 37 %      Immat GRANS % 0 %      Lymphocytes Relative 48 %      Monocytes Relative 10 %      Eosinophils Relative 4 %      Basophils Relative 1 %      Neutrophils Absolute 2 09 Thousands/µL      Immature Grans Absolute 0 01 Thousand/uL      Lymphocytes Absolute 2 81 Thousands/µL      Monocytes Absolute 0 54 Thousand/µL      Eosinophils Absolute 0 22 Thousand/µL      Basophils Absolute 0 03 Thousands/µL     POCT urinalysis dipstick [[id number]]  (Abnormal) Resulted:  09/15/19 1650    Lab Status:  Final result Specimen:  Urine, Other Updated:  09/15/19 1650    ED Urine Macroscopic [[id number]]  (Abnormal) Collected:  09/15/19 1631    Lab Status:  Final result Specimen:  Urine Updated:  09/15/19 1632     Color, UA Yellow     Clarity, UA Clear     pH, UA 6 0     Leukocytes, UA Negative     Nitrite, UA Negative     Protein, UA Negative mg/dl      Glucose, UA Negative mg/dl      Ketones, UA Negative mg/dl      Urobilinogen, UA 0 2 E U /dl      Bilirubin, UA Negative     Blood, UA Small     Specific [place], UA 1 020    Narrative:       CLINITEK RESULT                 CT abdomen pelvis with contrast   Final Result by Ashley Garcia MD (09/15 1757)      No acute finding in the abdomen or pelvis  Punctate nonobstructing right renal calculi  Workstation performed: [id number]                    Procedures  Procedures       ED Course  ED Course as of Sep 15 1826   Claytonw Tete Sep 15, 2019   1716 Labs and UA within normal limits      1810 Negative for acute findings  Symptoms may be secondary to passed kidney stone noted in July, will refer patient to urology for further evaluation  CT abdomen pelvis with contrast   1822 Pt resting comfortably, updated on labs and CT scan  Pt reports pain has improved with toradol  Will proceed with discharge home  MDM    Disposition  Final diagnoses:   Right lower quadrant abdominal pain     Time reflects when diagnosis was documented in both MDM as applicable and the Disposition within this note     Time User Action Codes Description Comment    9/15/2019  6:23 PM Zane Wheatley [address] [R10 31] Right lower quadrant abdominal pain       ED Disposition     ED Disposition Condition Date/Time Comment    Discharge Stable Sun Sep 15, 2019  6:23 PM [facility] discharge to home/self care              Follow-up Information     Follow up With Specialties Details Why Contact Info Additional [address] [facility] [place] [facility] Schedule an appointment as soon as possible for a visit  For re-evaluation [place] [phone]  [facility] [address], [address], [place], [place], [address]          Patient's Medications   Discharge Prescriptions    NAPROXEN (NAPROSYN) 500 MG TABLET    Take 1 tablet (500 mg total) by mouth 2 (two) times a day as needed for mild pain       Start Date: 9/15/2019 End Date: --       Order Dose: 500 mg       Quantity: 15 tablet    Refills: 0     No discharge procedures on file      ED Provider  Electronically Signed by           Luigi Cook DO  09/15/19 8685

## 2019-11-15 ENCOUNTER — HOSPITAL ENCOUNTER (EMERGENCY)
Facility: HOSPITAL | Age: 54
Discharge: HOME | End: 2019-11-16
Attending: EMERGENCY MEDICINE
Payer: COMMERCIAL

## 2019-11-15 ENCOUNTER — APPOINTMENT (EMERGENCY)
Dept: CT IMAGING | Facility: HOSPITAL | Age: 54
End: 2019-11-15
Payer: COMMERCIAL

## 2019-11-15 VITALS
RESPIRATION RATE: 17 BRPM | WEIGHT: 140.65 LBS | HEART RATE: 65 BPM | TEMPERATURE: 98 F | SYSTOLIC BLOOD PRESSURE: 138 MMHG | DIASTOLIC BLOOD PRESSURE: 65 MMHG | OXYGEN SATURATION: 96 % | BODY MASS INDEX: 24.92 KG/M2
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
## 2019-11-15 DIAGNOSIS — S16.1XXA STRAIN OF NECK MUSCLE, INITIAL ENCOUNTER: ICD-10-CM

## 2019-11-15 PROCEDURE — 99284 EMERGENCY DEPT VISIT MOD MDM: CPT

## 2019-11-15 PROCEDURE — 70450 CT HEAD/BRAIN W/O DYE: CPT

## 2019-11-15 PROCEDURE — 99284 EMERGENCY DEPT VISIT MOD MDM: CPT | Performed by: EMERGENCY MEDICINE

## 2019-11-15 PROCEDURE — 72125 CT NECK SPINE W/O DYE: CPT

## 2019-11-15 RX ORDER — METHOCARBAMOL 500 MG/1
500 TABLET, FILM COATED ORAL 2 TIMES DAILY
Qty: 10 TABLET | Refills: 0 | Status: SHIPPED | OUTPATIENT
Start: 2019-11-15

## 2019-11-15 RX ORDER — IBUPROFEN 600 MG/1
600 TABLET ORAL ONCE
Status: COMPLETED | OUTPATIENT
Start: 2019-11-15 | End: 2019-11-15

## 2019-11-15 RX ORDER — IBUPROFEN 600 MG/1
600 TABLET ORAL EVERY 8 HOURS PRN
Qty: 15 TABLET | Refills: 0 | Status: SHIPPED | OUTPATIENT
Start: 2019-11-15

## 2019-11-15 RX ADMIN — IBUPROFEN 600 MG: 600 TABLET, FILM COATED ORAL at 22:44

## 2019-11-16 NOTE — ED NOTES
Patient transported to [address] [place] [address]  [facility]  11/15/19 8015
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
## 2020-02-07 ENCOUNTER — TRANSCRIBE ORDERS (OUTPATIENT)
Dept: ADMINISTRATIVE | Facility: HOSPITAL | Age: 55
End: 2020-02-07

## 2020-02-07 DIAGNOSIS — S13.4XXA SPRAIN OF LIGAMENTS OF CERVICAL SPINE, INITIAL ENCOUNTER: Primary | ICD-10-CM

## 2020-02-07 DIAGNOSIS — S23.3XXA SPRAIN OF LIGAMENTS OF THORACIC SPINE, INITIAL ENCOUNTER: ICD-10-CM

## 2021-02-10 ENCOUNTER — TRANSCRIBE ORDERS (OUTPATIENT)
Dept: ADMINISTRATIVE | Facility: HOSPITAL | Age: 56
End: 2021-02-10

## 2024-04-15 ENCOUNTER — OCCMED (OUTPATIENT)
Dept: URGENT CARE | Age: 59
End: 2024-04-15
Payer: OTHER MISCELLANEOUS

## 2024-04-15 ENCOUNTER — APPOINTMENT (OUTPATIENT)
Dept: RADIOLOGY | Age: 59
End: 2024-04-15
Payer: OTHER MISCELLANEOUS

## 2024-04-15 DIAGNOSIS — S69.91XA HAND INJURY, RIGHT, INITIAL ENCOUNTER: Primary | ICD-10-CM

## 2024-04-15 DIAGNOSIS — S69.91XA HAND INJURY, RIGHT, INITIAL ENCOUNTER: ICD-10-CM

## 2024-04-15 PROCEDURE — G0383 LEV 4 HOSP TYPE B ED VISIT: HCPCS

## 2024-04-15 PROCEDURE — 99284 EMERGENCY DEPT VISIT MOD MDM: CPT

## 2024-04-15 PROCEDURE — 73130 X-RAY EXAM OF HAND: CPT

## 2024-04-22 ENCOUNTER — APPOINTMENT (OUTPATIENT)
Dept: URGENT CARE | Age: 59
End: 2024-04-22
Payer: OTHER MISCELLANEOUS

## 2024-04-22 PROCEDURE — 99214 OFFICE O/P EST MOD 30 MIN: CPT
